# Patient Record
Sex: MALE
[De-identification: names, ages, dates, MRNs, and addresses within clinical notes are randomized per-mention and may not be internally consistent; named-entity substitution may affect disease eponyms.]

---

## 2021-05-12 ENCOUNTER — APPOINTMENT (OUTPATIENT)
Age: 31
End: 2021-05-12
Payer: COMMERCIAL

## 2021-05-12 PROCEDURE — 0001A: CPT

## 2021-06-02 ENCOUNTER — APPOINTMENT (OUTPATIENT)
Age: 31
End: 2021-06-02
Payer: COMMERCIAL

## 2021-06-02 PROCEDURE — 0002A: CPT

## 2021-09-27 ENCOUNTER — IMMUNIZATION (OUTPATIENT)
Dept: PRIMARY CARE CLINIC | Facility: CLINIC | Age: 31
End: 2021-09-27
Payer: MEDICAID

## 2021-09-27 DIAGNOSIS — Z23 NEED FOR VACCINATION: Primary | ICD-10-CM

## 2021-09-27 PROCEDURE — 91300 COVID-19, MRNA, LNP-S, PF, 30 MCG/0.3 ML DOSE VACCINE: CPT | Mod: PBBFAC | Performed by: INTERNAL MEDICINE

## 2021-10-18 ENCOUNTER — IMMUNIZATION (OUTPATIENT)
Dept: PRIMARY CARE CLINIC | Facility: CLINIC | Age: 31
End: 2021-10-18
Payer: MEDICAID

## 2021-10-18 DIAGNOSIS — Z23 NEED FOR VACCINATION: Primary | ICD-10-CM

## 2021-10-18 PROCEDURE — 91300 COVID-19, MRNA, LNP-S, PF, 30 MCG/0.3 ML DOSE VACCINE: CPT | Mod: PBBFAC | Performed by: INTERNAL MEDICINE

## 2021-10-18 PROCEDURE — 0002A COVID-19, MRNA, LNP-S, PF, 30 MCG/0.3 ML DOSE VACCINE: CPT | Mod: CV19,PBBFAC | Performed by: INTERNAL MEDICINE

## 2023-01-20 ENCOUNTER — HOSPITAL ENCOUNTER (EMERGENCY)
Facility: HOSPITAL | Age: 33
Discharge: HOME | End: 2023-01-20
Attending: EMERGENCY MEDICINE
Payer: MEDICAID

## 2023-01-20 VITALS
OXYGEN SATURATION: 100 % | SYSTOLIC BLOOD PRESSURE: 128 MMHG | HEART RATE: 56 BPM | RESPIRATION RATE: 18 BRPM | WEIGHT: 150 LBS | TEMPERATURE: 98 F | DIASTOLIC BLOOD PRESSURE: 78 MMHG

## 2023-01-20 DIAGNOSIS — N48.9 PENILE LESION: ICD-10-CM

## 2023-01-20 DIAGNOSIS — R10.30 LOWER ABDOMINAL PAIN: Primary | ICD-10-CM

## 2023-01-20 LAB
ALBUMIN SERPL BCP-MCNC: 4.4 G/DL (ref 3.5–5.2)
ALP SERPL-CCNC: 91 U/L (ref 55–135)
ALT SERPL W/O P-5'-P-CCNC: 40 U/L (ref 10–44)
ANION GAP SERPL CALC-SCNC: 8 MMOL/L (ref 8–16)
AST SERPL-CCNC: 27 U/L (ref 10–40)
BASOPHILS # BLD AUTO: 0.04 K/UL (ref 0–0.2)
BASOPHILS NFR BLD: 0.8 % (ref 0–1.9)
BILIRUB SERPL-MCNC: 0.9 MG/DL (ref 0.1–1)
BILIRUB UR QL STRIP: NEGATIVE
BUN SERPL-MCNC: 17 MG/DL (ref 6–20)
BUN SERPL-MCNC: 18 MG/DL (ref 6–30)
CALCIUM SERPL-MCNC: 9.5 MG/DL (ref 8.7–10.5)
CHLORIDE SERPL-SCNC: 101 MMOL/L (ref 95–110)
CHLORIDE SERPL-SCNC: 102 MMOL/L (ref 95–110)
CLARITY UR REFRACT.AUTO: CLEAR
CO2 SERPL-SCNC: 28 MMOL/L (ref 23–29)
COLOR UR AUTO: YELLOW
CREAT SERPL-MCNC: 1 MG/DL (ref 0.5–1.4)
CREAT SERPL-MCNC: 1 MG/DL (ref 0.5–1.4)
DIFFERENTIAL METHOD: ABNORMAL
EOSINOPHIL # BLD AUTO: 0.1 K/UL (ref 0–0.5)
EOSINOPHIL NFR BLD: 1.4 % (ref 0–8)
ERYTHROCYTE [DISTWIDTH] IN BLOOD BY AUTOMATED COUNT: 12.7 % (ref 11.5–14.5)
EST. GFR  (NO RACE VARIABLE): >60 ML/MIN/1.73 M^2
GLUCOSE SERPL-MCNC: 89 MG/DL (ref 70–110)
GLUCOSE SERPL-MCNC: 91 MG/DL (ref 70–110)
GLUCOSE UR QL STRIP: NEGATIVE
HCT VFR BLD AUTO: 46 % (ref 40–54)
HCT VFR BLD CALC: 50 %PCV (ref 36–54)
HCV AB SERPL QL IA: NORMAL
HGB BLD-MCNC: 16.2 G/DL (ref 14–18)
HGB UR QL STRIP: NEGATIVE
HIV 1+2 AB+HIV1 P24 AG SERPL QL IA: NORMAL
IMM GRANULOCYTES # BLD AUTO: 0.01 K/UL (ref 0–0.04)
IMM GRANULOCYTES NFR BLD AUTO: 0.2 % (ref 0–0.5)
KETONES UR QL STRIP: NEGATIVE
LEUKOCYTE ESTERASE UR QL STRIP: NEGATIVE
LYMPHOCYTES # BLD AUTO: 1.6 K/UL (ref 1–4.8)
LYMPHOCYTES NFR BLD: 30.1 % (ref 18–48)
MCH RBC QN AUTO: 33.8 PG (ref 27–31)
MCHC RBC AUTO-ENTMCNC: 35.2 G/DL (ref 32–36)
MCV RBC AUTO: 96 FL (ref 82–98)
MONOCYTES # BLD AUTO: 0.6 K/UL (ref 0.3–1)
MONOCYTES NFR BLD: 12.4 % (ref 4–15)
NEUTROPHILS # BLD AUTO: 2.9 K/UL (ref 1.8–7.7)
NEUTROPHILS NFR BLD: 55.1 % (ref 38–73)
NITRITE UR QL STRIP: NEGATIVE
NRBC BLD-RTO: 0 /100 WBC
PH UR STRIP: 6 [PH] (ref 5–8)
PLATELET # BLD AUTO: 251 K/UL (ref 150–450)
PMV BLD AUTO: 10.2 FL (ref 9.2–12.9)
POC IONIZED CALCIUM: 1.15 MMOL/L (ref 1.06–1.42)
POC TCO2 (MEASURED): 31 MMOL/L (ref 23–29)
POTASSIUM BLD-SCNC: 4.4 MMOL/L (ref 3.5–5.1)
POTASSIUM SERPL-SCNC: 4.3 MMOL/L (ref 3.5–5.1)
PROT SERPL-MCNC: 8.3 G/DL (ref 6–8.4)
PROT UR QL STRIP: ABNORMAL
RBC # BLD AUTO: 4.8 M/UL (ref 4.6–6.2)
SAMPLE: ABNORMAL
SODIUM BLD-SCNC: 139 MMOL/L (ref 136–145)
SODIUM SERPL-SCNC: 138 MMOL/L (ref 136–145)
SP GR UR STRIP: 1.02 (ref 1–1.03)
URN SPEC COLLECT METH UR: ABNORMAL
WBC # BLD AUTO: 5.18 K/UL (ref 3.9–12.7)

## 2023-01-20 PROCEDURE — 96374 THER/PROPH/DIAG INJ IV PUSH: CPT | Mod: 59

## 2023-01-20 PROCEDURE — 25000003 PHARM REV CODE 250: Performed by: PHYSICIAN ASSISTANT

## 2023-01-20 PROCEDURE — 81003 URINALYSIS AUTO W/O SCOPE: CPT | Performed by: PHYSICIAN ASSISTANT

## 2023-01-20 PROCEDURE — 99284 PR EMERGENCY DEPT VISIT,LEVEL IV: ICD-10-PCS | Mod: ,,, | Performed by: PHYSICIAN ASSISTANT

## 2023-01-20 PROCEDURE — 63600175 PHARM REV CODE 636 W HCPCS: Performed by: PHYSICIAN ASSISTANT

## 2023-01-20 PROCEDURE — 87389 HIV-1 AG W/HIV-1&-2 AB AG IA: CPT | Performed by: EMERGENCY MEDICINE

## 2023-01-20 PROCEDURE — 85025 COMPLETE CBC W/AUTO DIFF WBC: CPT | Performed by: PHYSICIAN ASSISTANT

## 2023-01-20 PROCEDURE — 86803 HEPATITIS C AB TEST: CPT | Performed by: EMERGENCY MEDICINE

## 2023-01-20 PROCEDURE — 99285 EMERGENCY DEPT VISIT HI MDM: CPT | Mod: 25

## 2023-01-20 PROCEDURE — 80047 BASIC METABLC PNL IONIZED CA: CPT | Mod: 59

## 2023-01-20 PROCEDURE — 96361 HYDRATE IV INFUSION ADD-ON: CPT

## 2023-01-20 PROCEDURE — 25500020 PHARM REV CODE 255: Performed by: EMERGENCY MEDICINE

## 2023-01-20 PROCEDURE — 99284 EMERGENCY DEPT VISIT MOD MDM: CPT | Mod: ,,, | Performed by: PHYSICIAN ASSISTANT

## 2023-01-20 PROCEDURE — 86592 SYPHILIS TEST NON-TREP QUAL: CPT | Performed by: PHYSICIAN ASSISTANT

## 2023-01-20 PROCEDURE — 80053 COMPREHEN METABOLIC PANEL: CPT | Performed by: PHYSICIAN ASSISTANT

## 2023-01-20 RX ORDER — KETOROLAC TROMETHAMINE 30 MG/ML
10 INJECTION, SOLUTION INTRAMUSCULAR; INTRAVENOUS
Status: COMPLETED | OUTPATIENT
Start: 2023-01-20 | End: 2023-01-20

## 2023-01-20 RX ORDER — DICYCLOMINE HYDROCHLORIDE 20 MG/1
20 TABLET ORAL 3 TIMES DAILY PRN
Qty: 20 TABLET | Refills: 0 | Status: SHIPPED | OUTPATIENT
Start: 2023-01-20

## 2023-01-20 RX ADMIN — IOHEXOL 75 ML: 350 INJECTION, SOLUTION INTRAVENOUS at 04:01

## 2023-01-20 RX ADMIN — KETOROLAC TROMETHAMINE 10 MG: 30 INJECTION, SOLUTION INTRAMUSCULAR at 04:01

## 2023-01-20 RX ADMIN — SODIUM CHLORIDE 1000 ML: 0.9 INJECTION, SOLUTION INTRAVENOUS at 04:01

## 2023-01-20 NOTE — ED PROVIDER NOTES
"Encounter Date: 1/20/2023       History     Chief Complaint   Patient presents with    Abdominal Pain     Denies n/v; reports diarrhea     32-year-old male with no significant past history presents to the emergency department with chief complaint abdominal pain that began five days ago. It is intermittent. Occurs shortly after eating and drinking. Located to the lower abdomen, worse on the left. He has had similar pain in the past, but has never lasted this long. He reports having a few bowel movements per day. Some episodes are normal and some are loose. Denies hematochezia or melena. Also has some dysuria. No penile discharge or testicular pain. Sexually active. Also complains of "bruising to his penis" that he noticed last week. Denies penile pain. No trauma that he can recall. Denies fever, chills, cp, sob, hematuria. No flank or back pain. No medication prior to arrival. Denies worsening or alleviating factors.     Review of patient's allergies indicates:  No Known Allergies  History reviewed. No pertinent past medical history.  History reviewed. No pertinent surgical history.  History reviewed. No pertinent family history.  Social History     Tobacco Use    Smoking status: Every Day     Packs/day: 0.50     Types: Cigarettes    Smokeless tobacco: Never   Substance Use Topics    Alcohol use: Yes     Comment: 4-5 times per week, last use last night    Drug use: Never     Review of Systems   Constitutional:  Negative for fever.   HENT:  Negative for sore throat.    Respiratory:  Negative for shortness of breath.    Cardiovascular:  Negative for chest pain.   Gastrointestinal:  Positive for abdominal pain and diarrhea. Negative for nausea.   Genitourinary:  Positive for dysuria. Negative for penile discharge, penile pain and testicular pain.   Musculoskeletal:  Negative for back pain.   Skin:  Negative for rash.   Neurological:  Negative for weakness.   Hematological:  Does not bruise/bleed easily.     Physical " Exam     Initial Vitals [01/20/23 1243]   BP Pulse Resp Temp SpO2   (!) 146/89 73 18 97.6 °F (36.4 °C) 99 %      MAP       --         Physical Exam    Nursing note and vitals reviewed.  Constitutional: He appears well-developed and well-nourished. He is not diaphoretic. No distress.   HENT:   Head: Normocephalic and atraumatic.   Mouth/Throat: Oropharynx is clear and moist.   Eyes: EOM are normal. Pupils are equal, round, and reactive to light.   Neck: Neck supple.   Normal range of motion.  Cardiovascular:  Normal rate, regular rhythm, normal heart sounds and intact distal pulses.     Exam reveals no gallop and no friction rub.       No murmur heard.  Pulmonary/Chest: Breath sounds normal. He has no wheezes. He has no rhonchi. He has no rales.   Abdominal: Abdomen is soft. Bowel sounds are normal. There is no abdominal tenderness. There is no rebound and no guarding.   Genitourinary:    Genitourinary Comments:  exam performed with nursing chaperone present.  Patient has about 1 cm area of erythema to the glans.  There are vesicular lesions.  No fluctuance.  No drainage.  No testicular tenderness palpation bilaterally.     Musculoskeletal:         General: Normal range of motion.      Cervical back: Normal range of motion and neck supple.     Neurological: He is alert and oriented to person, place, and time. He has normal strength. GCS score is 15. GCS eye subscore is 4. GCS verbal subscore is 5. GCS motor subscore is 6.   Skin: Skin is warm and dry. Capillary refill takes less than 2 seconds.   Psychiatric: He has a normal mood and affect. His behavior is normal. Judgment and thought content normal.       ED Course   Procedures  Labs Reviewed   CBC W/ AUTO DIFFERENTIAL - Abnormal; Notable for the following components:       Result Value    MCH 33.8 (*)     All other components within normal limits    Narrative:     Release to patient->Immediate   URINALYSIS, REFLEX TO URINE CULTURE - Abnormal; Notable for the  following components:    Protein, UA Trace (*)     All other components within normal limits    Narrative:     Specimen Source->Urine   ISTAT PROCEDURE - Abnormal; Notable for the following components:    POC TCO2 (MEASURED) 31 (*)     All other components within normal limits   C. TRACHOMATIS/N. GONORRHOEAE BY AMP DNA   HIV 1 / 2 ANTIBODY    Narrative:     Release to patient->Immediate   HEPATITIS C ANTIBODY    Narrative:     Release to patient->Immediate   COMPREHENSIVE METABOLIC PANEL    Narrative:     Release to patient->Immediate   RPR   ISTAT CHEM8          Imaging Results              CT Abdomen Pelvis With Contrast (Final result)  Result time 01/20/23 16:25:49      Final result by Javier Og MD (01/20/23 16:25:49)                   Impression:      1. Findings may reflect hepatic steatosis, correlation with LFTs recommended.  2. No findings to suggest obstructive uropathy.  3. Please see above for additional findings.      Electronically signed by: Javier Og MD  Date:    01/20/2023  Time:    16:25               Narrative:    EXAMINATION:  CT ABDOMEN PELVIS WITH CONTRAST    CLINICAL HISTORY:  LLQ abdominal pain;    TECHNIQUE:  Low dose axial images, sagittal and coronal reformations were obtained from the lung bases to the pubic symphysis following the IV administration of 75 mL of Omnipaque 350 .  Oral contrast was not given.    COMPARISON:  None.    FINDINGS:  Images of the lower thorax are unremarkable.    The liver is hypoattenuating, may reflect steatosis, correlation with LFTs recommended.  The spleen, pancreas, gallbladder and adrenal glands are grossly unremarkable.  There is no biliary dilation or ascites.  The stomach is decompressed without wall thickening.  The portal vein, splenic vein, SMV, celiac axis and SMA all are patent.    The kidneys enhance symmetrically without hydronephrosis or nephrolithiasis.  The bilateral ureters are unremarkable without calculi seen.  The urinary  bladder is nondistended.  The prostate is not enlarged.    The large bowel is for the most part decompressed.  The terminal ileum and appendix are unremarkable.  The small bowel is unremarkable.  There are a few scattered shotty periaortic, pericaval, and mesenteric lymph nodes.  No focal organized pelvic fluid collection.    No acute osseous abnormality.                                       Medications   sodium chloride 0.9% bolus 1,000 mL 1,000 mL (0 mLs Intravenous Stopped 1/20/23 1713)   ketorolac injection 9.999 mg (9.999 mg Intravenous Given 1/20/23 1629)   iohexoL (OMNIPAQUE 350) injection 75 mL (75 mLs Intravenous Given 1/20/23 1616)     Medical Decision Making:   History:   Old Medical Records: I decided to obtain old medical records.  Initial Assessment:   Emergent evaluation of a 32 y.o. male presenting to the emergency department complaining of lower abdominal pain that occurs after eating and drinking with increased bowel movements and dysuria. Patient is afebrile, hemodynamically stable, and non toxic appearing.   Will order labs, imaging, UA, analgesia.   Differential Diagnosis:   Differential diagnosis includes but isn't limited to urinary tract infection, STI, syphilis, diverticulitis, ureterolithiasis.  Clinical Tests:   Lab Tests: Ordered and Reviewed  Radiological Study: Ordered and Reviewed  ED Management:  Patient is well-appearing with normal vital signs.  He presents with complaints of lower abdominal pain that occurs after eating and drinking for the last 5 days.  Associated with loose stool and dysuria.  He is sexually active.  He denies penile discharge, but reports a penile lesion that he noticed a few days ago.  He has no testicular pain or swelling.  No severe metabolic or hematologic derangements.  UA without evidence of infection.  CT abdomen pelvis with findings that may reflect hepatic steatosis.  The patient has normal LFTs.  No findings of obstructive uropathy.    I have notified  the patient of these incidental findings.  His gonorrhea and chlamydia testing and RPR are pending.  Will call with positive results.  Will discharge with Bentyl as needed for abdominal pain.  Return precautions.  All questions answered.  The patient was instructed to follow up with a primary care provider or to return to the emergency department for worsening symptoms. The treatment plan was discussed with the patient who demonstrated understanding and comfort with plan. The patient's history, physical exam, and plan of care was discussed with and agreed upon with my supervising physician.     Update - notified by nurse that patient's GC testing was cancelled due to inadequate urine sample. Nurse called patient to inform him of this. No answer. Voicemail left.            ED Course as of 01/20/23 1823   Fri Jan 20, 2023   1609 WBC: 5.18 []   1609 Hemoglobin: 16.2 [JM]   1609 Hematocrit: 46.0 [JM]   1609 Platelets: 251 []      ED Course User Index  [JM] Veronica Rouse PA-C                 Clinical Impression:   Final diagnoses:  [R10.30] Lower abdominal pain (Primary)  [N48.9] Penile lesion        ED Disposition Condition    Discharge Stable          ED Prescriptions       Medication Sig Dispense Start Date End Date Auth. Provider    dicyclomine (BENTYL) 20 mg tablet Take 1 tablet (20 mg total) by mouth 3 (three) times daily as needed (abdominal pain). 20 tablet 1/20/2023 -- Veronica Rouse PA-C          Follow-up Information       Follow up With Specialties Details Why Contact Info Additional Information    [facility] - Emergency Dept Emergency Medicine Go to  If symptoms worsen [address]  [facility] [address] [phone]     [facility] Int Med Primary Care [facility] Internal Medicine Schedule an appointment as soon as possible for a visit in 1 week  [address]  [facility] [address] [phone] [facility] Please park in surface lot  and check in at central registration desk             Veronica Rouse PA-C  01/20/23 1825

## 2023-01-20 NOTE — ED NOTES
Patient states lower abd pain onset Sunday, denies nausea or vomiting, diarrhea x 1 today , no pain meds

## 2023-01-20 NOTE — ED NOTES
Called patient with number on chart, asked to return to ED for urine specimen recollect per request PA

## 2023-01-20 NOTE — ED NOTES
Lab called, cancelled GC/Chlamydia urine for not enough specimen, patient already left ED. Will inform physician staff

## 2023-01-20 NOTE — DISCHARGE INSTRUCTIONS
Your CT scan shows possible hepatic steatosis, but your liver function tests are normal. Your urine is not infected. We will call you if your STI testing is positive. Take bentyl as needed for pain. Follow up with your primary doctor or return to the ER for any new or worsening symptoms.

## 2023-01-20 NOTE — ED NOTES
Patient identifiers verified and correct for  Mr Marie  C/C: Abd pain SEE NN  APPEARANCE: awake and alert in NAD. PAIN  9/10  SKIN: warm, dry and intact. No breakdown or bruising.  MUSCULOSKELETAL: Patient moving all extremities spontaneously, no obvious swelling or deformities noted. Ambulates independently.  RESPIRATORY: Denies shortness of breath.Respirations unlabored.   CARDIAC: Denies CP, 2+ distal pulses; no peripheral edema  ABDOMEN: ABdomen soft, reports pain to lower abdomen, reports nausea,no emesis  : voids spontaneously, denies difficulty  Neurologic: AAO x 4; follows commands equal strength in all extremities; denies numbness/tingling. Denies dizziness  Denies new weakness,

## 2023-01-21 LAB — RPR SER QL: NORMAL

## 2025-01-01 ENCOUNTER — HOSPITAL ENCOUNTER (EMERGENCY)
Facility: HOSPITAL | Age: 35
Discharge: HOME | End: 2025-01-01
Attending: EMERGENCY MEDICINE

## 2025-01-01 VITALS
WEIGHT: 145 LBS | DIASTOLIC BLOOD PRESSURE: 83 MMHG | HEIGHT: 68 IN | TEMPERATURE: 98 F | RESPIRATION RATE: 16 BRPM | SYSTOLIC BLOOD PRESSURE: 136 MMHG | HEART RATE: 87 BPM | BODY MASS INDEX: 21.98 KG/M2 | OXYGEN SATURATION: 97 %

## 2025-01-01 DIAGNOSIS — Z78.9 ALCOHOL USE: ICD-10-CM

## 2025-01-01 DIAGNOSIS — Z23 NEED FOR TD VACCINE: ICD-10-CM

## 2025-01-01 DIAGNOSIS — S99.911A RIGHT ANKLE INJURY, INITIAL ENCOUNTER: ICD-10-CM

## 2025-01-01 DIAGNOSIS — Z72.0 TOBACCO USE: ICD-10-CM
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
## 2025-01-01 DIAGNOSIS — S60.519A: ICD-10-CM

## 2025-01-01 PROCEDURE — 90715 TDAP VACCINE 7 YRS/> IM: CPT | Performed by: PHYSICIAN ASSISTANT

## 2025-01-01 PROCEDURE — 90471 IMMUNIZATION ADMIN: CPT | Performed by: PHYSICIAN ASSISTANT

## 2025-01-01 PROCEDURE — 25000003 PHARM REV CODE 250: Performed by: PHYSICIAN ASSISTANT

## 2025-01-01 PROCEDURE — 63600175 PHARM REV CODE 636 W HCPCS: Performed by: PHYSICIAN ASSISTANT

## 2025-01-01 PROCEDURE — 99284 EMERGENCY DEPT VISIT MOD MDM: CPT | Mod: 25

## 2025-01-01 RX ORDER — DICLOFENAC SODIUM 50 MG/1
50 TABLET, DELAYED RELEASE ORAL 3 TIMES DAILY PRN
Qty: 15 TABLET | Refills: 0 | Status: SHIPPED | OUTPATIENT
Start: 2025-01-01 | End: 2025-01-06

## 2025-01-01 RX ORDER — KETOROLAC TROMETHAMINE 10 MG/1
10 TABLET, FILM COATED ORAL
Status: COMPLETED | OUTPATIENT
Start: 2025-01-01 | End: 2025-01-01

## 2025-01-01 RX ORDER — ACETAMINOPHEN 325 MG/1
650 TABLET ORAL
Status: COMPLETED | OUTPATIENT
Start: 2025-01-01 | End: 2025-01-01

## 2025-01-01 RX ADMIN — ACETAMINOPHEN 650 MG: 325 TABLET ORAL at 03:01

## 2025-01-01 RX ADMIN — KETOROLAC TROMETHAMINE 10 MG: 10 TABLET, FILM COATED ORAL at 12:01

## 2025-01-01 RX ADMIN — CLOSTRIDIUM TETANI TOXOID ANTIGEN (FORMALDEHYDE INACTIVATED), CORYNEBACTERIUM DIPHTHERIAE TOXOID ANTIGEN (FORMALDEHYDE INACTIVATED), BORDETELLA PERTUSSIS TOXOID ANTIGEN (GLUTARALDEHYDE INACTIVATED), BORDETELLA PERTUSSIS FILAMENTOUS HEMAGGLUTININ ANTIGEN (FORMALDEHYDE INACTIVATED), BORDETELLA PERTUSSIS PERTACTIN ANTIGEN, AND BORDETELLA PERTUSSIS FIMBRIAE 2/3 ANTIGEN 0.5 ML: 5; 2; 2.5; 5; 3; 5 INJECTION, SUSPENSION INTRAMUSCULAR at 12:01

## 2025-01-01 RX ADMIN — BACITRACIN ZINC, NEOMYCIN, POLYMYXIN B 1 EACH: 400; 3.5; 5 OINTMENT TOPICAL at 12:01

## 2025-01-01 NOTE — ED TRIAGE NOTES
Pt reports R ankle pain with swelling after twisting it and falling. Pt denies head trauma. - LOC

## 2025-01-01 NOTE — ED PROVIDER NOTES
Encounter Date: 1/1/2025       History     Chief Complaint   Patient presents with    Fall     Slipped last night no LOC pain to r ankle foot , felt crack     The patient is a 34 year old male. He states that he was drinking last night on [place] and that he tripped over front door step around 2 am this morning, twisted his right ankle, and landed on both hands. His chief complaint is right foot/ankle pain. He reports mild pain and abrasions to palms of both hands. He denies hitting his head. He was ambulatory after the fall, but reports only being able to partially weight bear on right foot/ankle due to pain. He states that he did take a single tablet of prescription pain medication given to him by a family member, unsure of medication name, but denies any significant relief. He denies any neck or back pain/injury. He denies any additional injuries or concerns.       Review of patient's allergies indicates:  No Known Allergies  History reviewed. No pertinent past medical history.  History reviewed. No pertinent surgical history.  No family history on file.  Social History     Tobacco Use    Smoking status: Every Day     Current packs/day: 0.50     Types: Cigarettes    Smokeless tobacco: Never   Substance Use Topics    Alcohol use: Yes     Comment: 4-5 times per week, last use last night    Drug use: Never     Review of Systems   Constitutional:  Negative for chills, diaphoresis and fever.   HENT:  Negative for facial swelling.    Eyes:  Negative for visual disturbance.   Respiratory:  Negative for cough and shortness of breath.    Cardiovascular:  Negative for chest pain and leg swelling.   Gastrointestinal:  Negative for abdominal pain, diarrhea, nausea and vomiting.   Genitourinary:  Negative for flank pain.   Musculoskeletal:  Positive for arthralgias, gait problem and joint swelling. Negative for back pain and neck pain.   Skin:  Positive for wound.   Allergic/Immunologic: Negative for immunocompromised  state.   Neurological:  Negative for dizziness, seizures, syncope, facial asymmetry, speech difficulty, weakness, light-headedness, numbness and headaches.   Psychiatric/Behavioral:  Negative for confusion.        Physical Exam     Initial Vitals [01/01/25 1153]   BP Pulse Resp Temp SpO2   (!) 142/86 108 16 98.3 °F (36.8 °C) 96 %      MAP       --         Physical Exam    Nursing note and vitals reviewed.  Constitutional: He appears well-developed and well-nourished. He is not diaphoretic. He appears distressed.   Alert and interactive. Uncomfortable appearing, Antalgic gait, limping on right foot. Unaccompanied.    HENT:   Head: Normocephalic and atraumatic.   Eyes: Conjunctivae are normal. Pupils are equal, round, and reactive to light.   Neck:   Non-tender.    Normal range of motion.  Cardiovascular:  Intact distal pulses.           Mild tachycardia noted. 2+ DP and PT pulses of RLE.    Pulmonary/Chest: No respiratory distress. He exhibits no tenderness.   Abdominal: Abdomen is soft. He exhibits no distension. There is no abdominal tenderness. There is no rebound and no guarding.   Musculoskeletal:      Cervical back: Normal range of motion.      Comments: RLE: There is localized swelling and moderate tenderness to palpation of the lateral aspect of thee right ankle; no obvious deformity; no skin break; ROM limited; also tender to palpation of lateral aspect of right calcaneous/heel bone. No pain to palpation over metatarsals, shin, knee or hip.     Bilateral hands with acute superficial/minor abrasions of palms; no FB; no obvious swelling or deformity; no bony point tenderness; FROM of forearms, wrists, hands/digits. No snuff box tenderness.     T/L spine non-tender.      Neurological: He is alert and oriented to person, place, and time. He has normal strength. No sensory deficit. GCS score is 15. GCS eye subscore is 4. GCS verbal subscore is 5. GCS motor subscore is 6.   Skin: Skin is warm and dry.    Psychiatric: He has a normal mood and affect. His behavior is normal.               ED Course   Orthopedic Injury    Date/Time: 1/1/2025 2:40 PM    Performed by: Mj Dash PA-C  Authorized by: Hieu Woodson MD    Location procedure was performed:  [facility] EMERGENCY DEPARTMENT  Injury:     Injury location:  Ankle    Location details:  Right ankle    Injury type:  Soft tissue      Pre-procedure assessment:     Neurovascular status: Neurovascularly intact      Distal perfusion: normal      Neurological function: normal      Range of motion: reduced        Selections made in this section will also lock the Injury type section above.:     Immobilization:  Splint    Splint type: Walking boot.    Complications: No    Post-procedure assessment:     Neurovascular status: Neurovascularly intact      Distal perfusion: normal      Neurological function: normal      Range of motion: splinted      Patient tolerance:  Patient tolerated the procedure well with no immediate complications    Labs Reviewed - No data to display       Imaging Results              X-Ray Ankle Complete Right (In process)                      X-Ray Foot Complete Right (In process)  Result time 01/01/25 16:32:36                     X-Ray Hand 2 View Bilat (In process)  Result time 01/01/25 16:32:46                     Medications   neomycin-bacitracnZn-polymyxnB packet (1 each Topical (Top) Given 1/1/25 1229)   Tdap vaccine injection 0.5 mL (0.5 mLs Intramuscular Given 1/1/25 1229)   ketorolac tablet 10 mg (10 mg Oral Given 1/1/25 1229)   acetaminophen tablet 650 mg (650 mg Oral Given 1/1/25 1500)     Medical Decision Making  Amount and/or Complexity of Data Reviewed  Radiology: ordered.    Risk  OTC drugs.  Prescription drug management.      Additional MDM:   Smoking Cessation: The patient is a smoker. The patient was counseled on smoking cessation for: 3 minutes. The patient was counseled on tobacco related  health complications.  Appropriate patient literature was given to the patient concerning tobacco cessation.     X-Rays: I have independently interpreted X-Ray(s) - see notes. X rays of bilateral hands, right ankle, and right foot were obtained. No acute fractures or dislocations identified.                       Medical Decision Making:   Initial Assessment:   Healthy 34 year old male, here c/o trip and fall last night while drinking; chief complaint of right ankle/foot pain, also reports mild pain and abrasions to bilateral hands/palms. Denies hitting head. No neck/back pain.   Differential Diagnosis:   Fall, trauma, ankle injury, foot injury, fracture, dislocation, hand injury, abrasions/wounds, alcohol use disorder, etc   Clinical Tests:   Radiological Study: Ordered and Reviewed  ED Management:  Vital signs reviewed, mildly elevated blood pressure and heart rate, likely secondary; benign   Chart review completed   Ice pack applied to ankle   PO Toradol given for pain   Reports last TD vaccine > 5 years ago, updated  Abrasions to bilateral palms cleaned with anti-septic and irrigated with saline, dressed with triple antibiotic ointment and sterile gauze dressing   Right ankle x ray completed   Right foot x ray completed   Bilateral hand x rays completed   Delay in radiologist report on radiographs. No acute findings identified on my preliminary read. Pt very eager for discharge, discussed case with ER attending physician, who reviewed images, no acute findings identified, recommends walking boot and close outpatient follow up.   Patient informed of preliminary x ray interpretation and pending official radiology report, does not want to wait for official read. Low suspicion for fracture, history, exam, and x rays consistent with ankle sprain/strain. RICE instructions provided. Walking boot applied. Rx for NSAID given for pain. Hand abrasions cleaned and dressed. Ortho follow up provided. Return precautions provided.    Pt informed of  results and advised to follow up with his primary care physician this week for re-evaluation and further management   Return precautions advised       Other:   I have discussed this case with another health care provider.             Clinical Impression:  Final diagnoses:  [W19.XXXA] Fall (Primary)  [Z78.9] Alcohol use  [Z72.0] Tobacco use  [Z23] Need for Td vaccine  [S60.519A] Abrasion of palm of hand, initial encounter  [S99.911A] Right ankle injury, initial encounter          ED Disposition Condition    Discharge Stable          ED Prescriptions       Medication Sig Dispense Start Date End Date Auth. Provider    diclofenac (VOLTAREN) 50 MG EC tablet Take 1 tablet (50 mg total) by mouth 3 (three) times daily as needed (pain). 15 tablet 1/1/2025 1/6/2025 Mj Dash PA-C          Follow-up Information       Follow up With Specialties Details Why Contact Info Additional Information    [facility] - Emergency Dept Emergency Medicine  Return to the ER if worse in any way or if unimproved [address]  [facility] [id number] [phone]     [facility] - Orthopedics [facility] Orthopedics Schedule an appointment as soon as possible for a visit  Follow up with orthopedics for further evaluation and management of acute right ankle injury. Use walking boot as discussed. [address]  [facility] [address] [phone] [facility] - Main Building, 5th Floor Please park in [facility] and take Atrium elevator             Mj Dash PA-C  01/01/25 [id number]

## 2025-01-01 NOTE — Clinical Note
"Luis Haynesryley Marie was seen and treated in our emergency department on 1/1/2025.  He may return to work on 01/04/2025.       If you have any questions or concerns, please don't hesitate to call.      Molly Siddiqui, FELECIAP     "

## 2025-01-11 ENCOUNTER — HOSPITAL ENCOUNTER (EMERGENCY)
Facility: HOSPITAL | Age: 35
Discharge: HOME | End: 2025-01-11
Attending: EMERGENCY MEDICINE

## 2025-01-11 VITALS
SYSTOLIC BLOOD PRESSURE: 132 MMHG | HEIGHT: 68 IN | RESPIRATION RATE: 20 BRPM | TEMPERATURE: 99 F | BODY MASS INDEX: 22.73 KG/M2 | WEIGHT: 150 LBS | DIASTOLIC BLOOD PRESSURE: 93 MMHG | OXYGEN SATURATION: 98 % | HEART RATE: 93 BPM

## 2025-01-11 DIAGNOSIS — S93.401A SPRAIN OF RIGHT ANKLE, UNSPECIFIED LIGAMENT, INITIAL ENCOUNTER: ICD-10-CM

## 2025-01-11 DIAGNOSIS — B35.0 TINEA CAPITIS: Primary | ICD-10-CM

## 2025-01-11 DIAGNOSIS — B35.4 TINEA CORPORIS: ICD-10-CM

## 2025-01-11 LAB
HCV AB SERPL QL IA: NORMAL
HIV 1+2 AB+HIV1 P24 AG SERPL QL IA: NORMAL

## 2025-01-11 PROCEDURE — 99283 EMERGENCY DEPT VISIT LOW MDM: CPT

## 2025-01-11 PROCEDURE — 87389 HIV-1 AG W/HIV-1&-2 AB AG IA: CPT | Performed by: PHYSICIAN ASSISTANT

## 2025-01-11 PROCEDURE — 86803 HEPATITIS C AB TEST: CPT | Performed by: PHYSICIAN ASSISTANT

## 2025-01-11 RX ORDER — ITRACONAZOLE 100 MG/1
200 CAPSULE ORAL DAILY
Qty: 60 CAPSULE | Refills: 0 | Status: SHIPPED | OUTPATIENT
Start: 2025-01-11 | End: 2025-02-10

## 2025-01-11 RX ORDER — DICLOFENAC SODIUM 50 MG/1
50 TABLET, DELAYED RELEASE ORAL 3 TIMES DAILY
COMMUNITY

## 2025-01-11 NOTE — ED NOTES
Pt identifiers Luis Marie were checked and are correct  LOC: The patient is awake, alert, aware of environment with an appropriate affect. Oriented x4, speaking appropriately  APPEARANCE: Pt resting comfortably, in no acute distress, pt is clean and well groomed, clothing properly fastened   SKIN: Skin warm, dry and intact, normal skin turgor, moist mucus membranes  Dry scaly skin noted along hairline of forehead and penis  RESPIRATORY: Airway is open and patent, respirations are spontaneous, even and unlabored, normal effort and rate  CARDIAC: Normal rate and rhythm, no peripheral edema noted, capillary refill < 3 seconds, bilateral radial pulses 2+  ABDOMEN: Soft, nontender, nondistended. Bowel sounds present   NEUROLOGIC: PERRL, facial expression is symmetrical, patient moving all extremities spontaneously, normal sensation in all extremities when touched with a finger.  Follows all commands appropriately  MUSCULOSKELETAL: Walking boot on right lower leg

## 2025-01-11 NOTE — ED PROVIDER NOTES
Emergency Department Provider Note    Luis Marie   34 y.o. male   [id number]      1/11/2025       History     This history was obtained from the patient without limitations. He presented by personal transportation.     He is a 34-year-old with no diagnosed chronic medical conditions.  He was seen here in the ED on 01/01/2025 with right ankle pain that occurred after a twisting injury.  He was diagnosed with a sprain and discharged with a walking boot.  He complains of continued pain to the joint.  He was prescribed diclofenac which relieved his pain but ran out of the medication and has not taken OTC analgesics.  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    He complains of intermittent numbness to his right upper extremity for three days.  These episodes occur at random.  He denies weakness to the extremity.  He denies trauma to the extremity.  He denies neck pain.  He denies chest pain, palpitations, and shortness of breath. He is right handed.    He complains of a drug, itchy rash to his anterior scalp and penis that began several weeks ago.  He has been using clotrimazole wishes caused the area to become more dry and flaky.  He denies penile pain and urethral discharge.  He denies testicular enlargement and pain.    He denies fever, chills, nausea, and vomiting.         History reviewed. No pertinent past medical history.   History reviewed. No pertinent surgical history.   No family history on file.   Social History     Socioeconomic History    Marital status: Single   Occupational History    Occupation:    Tobacco Use    Smoking status: Every Day     Current packs/day: 0.50     Types: Cigarettes    Smokeless tobacco: Never   Substance and Sexual Activity    Alcohol use: Yes     Comment: three times a week    Drug use: Never    Sexual activity: Not Currently      Review of patient's  allergies indicates:  No Known Allergies        Physical Examination     Initial Vitals [01/11/25 1114]   BP Pulse Resp Temp SpO2   (!) 135/99 85 18 98.1 °F (36.7 °C) 99 %      MAP       --           Physical Exam    Nursing note and vitals reviewed.  Constitutional: He is not diaphoretic. No distress.   HENT:   Head: Normocephalic and atraumatic.   Neck:    Full passive range of motion without pain.     Cardiovascular:  Normal rate and regular rhythm.           No murmur heard.  Pulmonary/Chest: No respiratory distress. He has no decreased breath sounds. He has no wheezes. He has no rhonchi. He has no rales.   Genitourinary: Circumcised. No penile erythema or penile tenderness. No discharge found.    Genitourinary Comments: Dry, flaky, scaly skin to the distal penile shaft.      Musculoskeletal:      Right shoulder: No swelling, deformity or tenderness. Normal range of motion.      Right upper arm: No swelling, deformity or tenderness.      Right elbow: No swelling or deformity. Normal range of motion. No tenderness.      Right forearm: No swelling, deformity or tenderness.      Right wrist: No swelling, deformity or tenderness. Normal range of motion.      Right hand: No swelling or tenderness. Normal range of motion.      Cervical back: Full passive range of motion without pain. No edema or erythema. No spinous process tenderness or muscular tenderness.      Right ankle: No swelling or deformity. No tenderness. Normal range of motion.      Right foot: Normal range of motion. No swelling, deformity or tenderness.     Neurological: He is alert and oriented to person, place, and time. GCS eye subscore is 4. GCS verbal subscore is 5. GCS motor subscore is 6.   RUE: Normal strength. No sensory deficits.   Skin: Skin is warm and dry. No pallor.   Dry, flaky, scaly skin to the anterior scalp. No bleeding. No tenderness.   Psychiatric: He has a normal mood and affect. His speech is normal and behavior is normal. He is  not actively hallucinating. He is attentive.            Labs     Labs Reviewed   HEPATITIS C ANTIBODY   HIV 1 / 2 ANTIBODY        Imaging     Imaging Results    None           ED Course     The patient received the following medications:  None            Medical Decision Making                 Medical Decision Making            Diagnoses       ICD-10-CM ICD-9-CM   1. Tinea capitis  B35.0 110.0   2. Tinea corporis  B35.4 110.5   3. Sprain of right ankle, unspecified ligament, initial encounter  S93.401A 845.00         Dispostion      ED Disposition Condition    Discharge Stable            ED Prescriptions       Medication Sig Dispense Start Date End Date Auth. Provider    itraconazole (SPORANOX) 100 mg Cap Take 2 capsules (200 mg total) by mouth once daily. 60 capsule 1/11/2025 2/10/2025 Matthew Buchanan III, MD            Follow-up Information       Follow up With Specialties Details Why Contact Info    A primary care provider (internal medicine or family medicine)  Schedule an appointment as soon as possible for a visit       ER   Return to the ER for any concerns that you feel need immediate attention.               Matthew Buchanan III, MD  01/11/25 5630

## 2025-01-11 NOTE — DISCHARGE INSTRUCTIONS
It is no longer necessary for you to where the walking boot.  You can return to wearing regular shoes.  You may continued to have ankle pain from your sprain.  Take acetaminophen 650 mg every 6 hours as needed for pain.  You may alternate this with ibuprofen 400 mg every 6 hours as needed.  Do not take these two medications at the same time.  Stagger their dosing.    We know that you have many choices and are honored that you chose us. We hope that we met or exceeded your expectations and goals for this visit and will keep the [facility] family in mind for your future needs and those of your family and friends.     - Dr. Buchanan

## 2025-01-11 NOTE — ED TRIAGE NOTES
Pt complains of dry scaly skin along hair line and to private area times two weeks  Pt also complains of intermittent numbness to right shoulder and arm